# Patient Record
Sex: MALE | Race: BLACK OR AFRICAN AMERICAN | Employment: FULL TIME | URBAN - METROPOLITAN AREA
[De-identification: names, ages, dates, MRNs, and addresses within clinical notes are randomized per-mention and may not be internally consistent; named-entity substitution may affect disease eponyms.]

---

## 2020-02-18 PROCEDURE — 74011250637 HC RX REV CODE- 250/637: Performed by: EMERGENCY MEDICINE

## 2020-02-18 PROCEDURE — 99284 EMERGENCY DEPT VISIT MOD MDM: CPT

## 2020-02-18 RX ORDER — ATORVASTATIN CALCIUM 80 MG/1
80 TABLET, FILM COATED ORAL
COMMUNITY
Start: 2019-11-21

## 2020-02-18 RX ORDER — FUROSEMIDE 20 MG/1
20 TABLET ORAL
COMMUNITY
Start: 2018-10-15

## 2020-02-18 RX ORDER — DILTIAZEM HYDROCHLORIDE 240 MG/1
240 CAPSULE, COATED, EXTENDED RELEASE ORAL
COMMUNITY
Start: 2019-03-07

## 2020-02-18 RX ORDER — GLIPIZIDE 5 MG/1
5 TABLET, FILM COATED, EXTENDED RELEASE ORAL
COMMUNITY
Start: 2019-05-15

## 2020-02-18 RX ORDER — ACETAMINOPHEN 500 MG
1000 TABLET ORAL
Status: COMPLETED | OUTPATIENT
Start: 2020-02-18 | End: 2020-02-18

## 2020-02-18 RX ADMIN — ACETAMINOPHEN 1000 MG: 500 TABLET ORAL at 23:58

## 2020-02-19 ENCOUNTER — HOSPITAL ENCOUNTER (EMERGENCY)
Age: 70
Discharge: HOME | End: 2020-02-19
Attending: EMERGENCY MEDICINE
Payer: MEDICARE

## 2020-02-19 ENCOUNTER — APPOINTMENT (OUTPATIENT)
Dept: GENERAL RADIOLOGY | Age: 70
End: 2020-02-19
Attending: EMERGENCY MEDICINE
Payer: MEDICARE

## 2020-02-19 VITALS
HEART RATE: 71 BPM | TEMPERATURE: 99.3 F | OXYGEN SATURATION: 93 % | WEIGHT: 300 LBS | DIASTOLIC BLOOD PRESSURE: 65 MMHG | SYSTOLIC BLOOD PRESSURE: 156 MMHG | HEIGHT: 72 IN | BODY MASS INDEX: 40.63 KG/M2 | RESPIRATION RATE: 16 BRPM

## 2020-02-19 DIAGNOSIS — R68.89 FLU-LIKE SYMPTOMS: Primary | ICD-10-CM

## 2020-02-19 LAB
FLUAV AG NPH QL IA: NEGATIVE
FLUBV AG NPH QL IA: NEGATIVE
SPECIMEN SOURCE: NORMAL

## 2020-02-19 PROCEDURE — 74011250637 HC RX REV CODE- 250/637

## 2020-02-19 PROCEDURE — 87804 INFLUENZA ASSAY W/OPTIC: CPT

## 2020-02-19 PROCEDURE — 71046 X-RAY EXAM CHEST 2 VIEWS: CPT

## 2020-02-19 RX ORDER — IBUPROFEN 800 MG/1
800 TABLET ORAL
Status: COMPLETED | OUTPATIENT
Start: 2020-02-19 | End: 2020-02-19

## 2020-02-19 RX ORDER — IBUPROFEN 800 MG/1
TABLET ORAL
Status: COMPLETED
Start: 2020-02-19 | End: 2020-02-19

## 2020-02-19 RX ADMIN — IBUPROFEN 800 MG: 800 TABLET, FILM COATED ORAL at 02:45

## 2020-02-19 RX ADMIN — IBUPROFEN 800 MG: 800 TABLET ORAL at 02:45

## 2020-02-19 NOTE — DISCHARGE INSTRUCTIONS
Your flu screen was negative but this is a syndrome very similar to the flu. Continue using your 600 to 800 mg of ibuprofen every 6 hours for muscle aches and pains. Follow-up with your doctor for reevaluation. Return to the emergency department should you develop chest pain or shortness of breath.

## 2020-02-19 NOTE — ED PROVIDER NOTES
68-year-old male presenting for flulike symptoms. Symptoms started earlier today with mild cough, fever and muscle aches  Checked his blood pressure and it was sufficiently high that it concerned him  Took a Tylenol and his fever is broken now he feels much better. He did get his flu shot this year  Never really felt this way before      The history is provided by the patient and the spouse. Hypertension    This is a new problem. The current episode started 12 to 24 hours ago. The problem has been gradually improving. Associated symptoms include malaise/fatigue. Pertinent negatives include no chest pain, no orthopnea, no palpitations, no PND, no anxiety, no confusion, no blurred vision, no headaches, no tinnitus, no neck pain, no peripheral edema, no dizziness, no shortness of breath, no nausea and no vomiting. Risk factors include male gender and hypertension. Past Medical History:   Diagnosis Date    Asthma     BPH     Hypertension        History reviewed. No pertinent surgical history. History reviewed. No pertinent family history.     Social History     Socioeconomic History    Marital status:      Spouse name: Not on file    Number of children: Not on file    Years of education: Not on file    Highest education level: Not on file   Occupational History    Not on file   Social Needs    Financial resource strain: Not on file    Food insecurity:     Worry: Not on file     Inability: Not on file    Transportation needs:     Medical: Not on file     Non-medical: Not on file   Tobacco Use    Smoking status: Passive Smoke Exposure - Never Smoker    Smokeless tobacco: Never Used   Substance and Sexual Activity    Alcohol use: Yes     Comment: occ    Drug use: No    Sexual activity: Not Currently   Lifestyle    Physical activity:     Days per week: Not on file     Minutes per session: Not on file    Stress: Not on file   Relationships    Social connections:     Talks on phone: Not on file     Gets together: Not on file     Attends [employer]ical service: Not on file     Active member of club or organization: Not on file     Attends meetings of clubs or organizations: Not on file     Relationship status: Not on file    Intimate partner violence:     Fear of current or ex partner: Not on file     Emotionally abused: Not on file     Physically abused: Not on file     Forced sexual activity: Not on file   Other Topics Concern    Not on file   Social History Narrative    Not on file         ALLERGIES: Pcn [penicillins]    Review of Systems   Constitutional: Positive for chills, fatigue, fever and malaise/fatigue. HENT: Negative for tinnitus. Eyes: Negative for blurred vision. Respiratory: Positive for cough. Negative for shortness of breath. Cardiovascular: Negative for chest pain, palpitations, orthopnea and PND. Gastrointestinal: Negative for nausea and vomiting. Musculoskeletal: Positive for myalgias. Negative for neck pain. Neurological: Negative for dizziness and headaches. Psychiatric/Behavioral: Negative for confusion. All other systems reviewed and are negative. Vitals:    02/18/20 2356 02/18/20 2358 02/19/20 0051   BP: 137/79 149/76    Pulse: 87     Resp: 16     Temp: (!) 100.5 °F (38.1 °C)     SpO2: 95%  95%   Weight: 136.1 kg (300 lb)     Height: 6' (1.829 m)              Physical Exam  Vitals signs and nursing note reviewed. Constitutional:       Appearance: He is well-developed. HENT:      Head: Normocephalic and atraumatic. Eyes:      Conjunctiva/sclera: Conjunctivae normal.      Pupils: Pupils are equal, round, and reactive to light. Neck:      Musculoskeletal: Normal range of motion and neck supple. Cardiovascular:      Rate and Rhythm: Normal rate and regular rhythm. Heart sounds: Normal heart sounds. Pulmonary:      Effort: Pulmonary effort is normal.      Breath sounds: Normal breath sounds.    Abdominal:      General: Bowel sounds are normal.      Palpations: Abdomen is soft. Musculoskeletal: Normal range of motion. General: No deformity. Skin:     General: Skin is warm and dry. Neurological:      Mental Status: He is alert and oriented to person, place, and time. Cranial Nerves: No cranial nerve deficit. Psychiatric:         Behavior: Behavior normal.          MDM  Number of Diagnoses or Management Options  Flu-like symptoms:   Diagnosis management comments: 79-year-old male presenting for flulike symptoms. Rapid flu was performed in triage and is negative. He does report a cough and has some mildly low O2 sats we will get a chest x-ray. The patient does not want to do blood work as he is feeling better after some Tylenol. His blood pressure also came down as well. Amount and/or Complexity of Data Reviewed  Clinical lab tests: ordered and reviewed (Results for orders placed or performed during the hospital encounter of 02/19/20  -INFLUENZA A & B AG (RAPID TEST)       Result                      Value             Ref Range           Influenza A Ag              NEGATIVE          NEG                 Influenza B Ag              NEGATIVE          NEG                 Source                                                        NASOPHARYNGEAL  )  Tests in the radiology section of CPT®: ordered and reviewed (Xr Chest Pa Lat    Result Date: 2/19/2020  EXAM: Chest x-ray. INDICATION: Weakness. COMPARISON: Prior chest x-ray on May 19, 2013. TECHNIQUE: Frontal and lateral view chest x-ray. FINDINGS: The lungs are clear. Cardiomegaly is unchanged. The mediastinal contour and pulmonary vasculature are within normal limits. No pneumothorax or pleural effusion is seen. Degenerative changes are noted in the spine. IMPRESSION: 1. No acute process.  2. Cardiomegaly.    )  Independent visualization of images, tracings, or specimens: yes    Risk of Complications, Morbidity, and/or Mortality  Presenting problems: high  Diagnostic procedures: moderate  Management options: moderate  General comments: Patient is feeling better. Physical exam is reassuring. Vital signs have normalized. Flu screen and chest x-ray are clear. Treating as presumptive influenza-like illness with supportive care measures.   Discharge home    Patient Progress  Patient progress: improved         Procedures

## 2020-02-19 NOTE — ED TRIAGE NOTES
Patient co elevated bp. Patient states he began to have a headache around 4 pm.  Patient states he took bp med this morning as schedule.  Patient also running fever that started around 3 pm today denies taking anything otc for fever

## 2020-02-19 NOTE — ED NOTES
I have reviewed discharge instructions with the patient. The patient verbalized understanding. Patient left ED via Discharge Method: ambulatory to Home with wife. Opportunity for questions and clarification provided. Patient given 0 scripts. The pt was in no acute distress at [place]. To continue your aftercare when you leave the hospital, you may receive an automated call from our care team to check in on how you are doing. This is a free service and part of our promise to provide the best care and service to meet your aftercare needs.  If you have questions, or wish to unsubscribe from this service please call [phone]. Thank you for Choosing our [facility] Emergency Department.

## 2024-09-04 ENCOUNTER — TELEPHONE (OUTPATIENT)
Dept: OTHER | Facility: CLINIC | Age: 74
End: 2024-09-04